# Patient Record
Sex: FEMALE | Race: WHITE | URBAN - METROPOLITAN AREA
[De-identification: names, ages, dates, MRNs, and addresses within clinical notes are randomized per-mention and may not be internally consistent; named-entity substitution may affect disease eponyms.]

---

## 2020-08-06 ENCOUNTER — OFFICE VISIT (OUTPATIENT)
Dept: FAMILY MEDICINE CLINIC | Age: 50
End: 2020-08-06
Payer: COMMERCIAL

## 2020-08-06 VITALS
HEART RATE: 81 BPM | WEIGHT: 263 LBS | HEIGHT: 68 IN | SYSTOLIC BLOOD PRESSURE: 100 MMHG | DIASTOLIC BLOOD PRESSURE: 62 MMHG | TEMPERATURE: 98 F | OXYGEN SATURATION: 98 % | BODY MASS INDEX: 39.86 KG/M2

## 2020-08-06 PROCEDURE — 99204 OFFICE O/P NEW MOD 45 MIN: CPT | Performed by: FAMILY MEDICINE

## 2020-08-06 RX ORDER — LANOLIN ALCOHOL/MO/W.PET/CERES
1000 CREAM (GRAM) TOPICAL DAILY
COMMUNITY

## 2020-08-06 ASSESSMENT — PATIENT HEALTH QUESTIONNAIRE - PHQ9
1. LITTLE INTEREST OR PLEASURE IN DOING THINGS: 0
SUM OF ALL RESPONSES TO PHQ9 QUESTIONS 1 & 2: 1
SUM OF ALL RESPONSES TO PHQ QUESTIONS 1-9: 1
2. FEELING DOWN, DEPRESSED OR HOPELESS: 1
SUM OF ALL RESPONSES TO PHQ QUESTIONS 1-9: 1

## 2020-08-06 NOTE — PROGRESS NOTES
2020    Rony White (:  1970) is a 44 y.o. female, here for evaluation of the following medical concerns:    Rony White is a 44 y.o. female. Patient presents with:  Establish Care      1. Radicular symptoms right arm  Prior disk surgery and nerve damage right arm. Pain fairly constant. Notes that it is worse with movement of neck and arm. 2.  Migraines: Not controlled by imitrex and topamax. Seeing neurology next week at [facility]. Has been having more frequency and imitrex is not fully stopping them. 3.  Insomnia: ambien xr not working well. Does not last long enough, and does not allow her to sleep well through the night. 4.  Patient has hx of blood clots, currently anticoagulated. Has had multiple previously. Doing well on current medications. The patients PMH, surgical history, family history, medications, allergies were all reviewed and updated as appropriate today. Review of Systems   Constitutional: Negative for fatigue and unexpected weight change. HENT: Negative for congestion, rhinorrhea and trouble swallowing. Eyes: Negative for pain and visual disturbance. Respiratory: Negative for chest tightness and shortness of breath. Cardiovascular: Negative for chest pain and palpitations. Gastrointestinal: Negative for constipation and diarrhea. Endocrine: Negative for cold intolerance and heat intolerance. Genitourinary: Negative for frequency and urgency. Musculoskeletal: Negative for arthralgias and back pain. Skin: Negative for color change and rash. Allergic/Immunologic: Negative for environmental allergies and food allergies. Neurological: Negative for dizziness and light-headedness. Hematological: Negative for adenopathy. Does not bruise/bleed easily. Psychiatric/Behavioral: Negative for dysphoric mood and sleep disturbance. Prior to Visit Medications    Medication Sig Taking?  Authorizing Provider   vitamin B-12 (CYANOCOBALAMIN) 1000 MCG tablet Take 1,000 mcg by mouth daily Yes Historical Provider, MD   Cholecalciferol (VITAMIN D3 PO) Take 1,000 Units by mouth 2 times daily Yes Historical Provider, MD   rivaroxaban (XARELTO) 20 MG TABS tablet Take 20 mg by mouth daily Yes Historical Provider, MD   zolpidem (AMBIEN CR) 12.5 MG extended release tablet Take 12.5 mg by mouth nightly as needed. Yes Historical Provider, MD   topiramate (TOPAMAX) 50 MG tablet Take 50 mg by mouth 2 times daily Yes Historical Provider, MD   SUMAtriptan (IMITREX) 50 MG tablet 100 mg 2 times daily Yes Historical Provider, MD   tamsulosin (FLOMAX) 0.4 MG capsule Take 0.4 mg by mouth nightly Yes Historical Provider, MD   oxyCODONE-acetaminophen (PERCOCET) 5-325 MG per tablet Take 1-2 tablets by mouth every 4 hours as needed. Yes Historical Provider, MD   albuterol sulfate  (90 Base) MCG/ACT inhaler Inhale 2 puffs into the lungs every 6 hours as needed for Wheezing Yes Historical Provider, MD   Ubrogepant (UBRELVY) 100 MG TABS 1 tab by mouth at onset of headache. May repeat after 2 hours if no relief. No more than 2 doses in 24 hours.   Historical Provider, MD        No Known Allergies    Past Medical History:   Diagnosis Date    Allergic rhinitis     Hx of blood clots     last 7/2020, hematology [facility]    Insomnia     Migraines     Nephrolithiasis        Past Surgical History:   Procedure Laterality Date    CARPAL TUNNEL RELEASE Left     CHOLECYSTECTOMY      KIDNEY STONE SURGERY      NECK SURGERY      fusion        Social History     Socioeconomic History    Marital status:      Spouse name: Not on file    Number of children: Not on file    Years of education: Not on file    Highest education level: Not on file   Occupational History    Not on file   Social Needs    Financial resource strain: Not on file    Food insecurity     Worry: Not on file     Inability: Not on file    Transportation needs     Medical: Not on file     Non-medical: Not on file   Tobacco Use    Smoking status: Never Smoker    Smokeless tobacco: Never Used   Substance and Sexual Activity    Alcohol use: Never     Frequency: Never    Drug use: Never    Sexual activity: Yes     Partners: Male     Comment: [address]    Physical activity     Days per week: Not on file     Minutes per session: Not on file    Stress: Not on file   Relationships    Social connections     Talks on phone: Not on file     Gets together: Not on file     Attends Taoist service: Not on file     Active member of club or organization: Not on file     Attends meetings of clubs or organizations: Not on file     Relationship status: Not on file    Intimate partner violence     Fear of current or ex partner: Not on file     Emotionally abused: Not on file     Physically abused: Not on file     Forced sexual activity: Not on file   Other Topics Concern    Not on file   Social History Narrative    Not on file        History reviewed. No pertinent family history. Vitals:    08/06/20 1007   BP: 100/62   Pulse: 81   Temp: 98 °F (36.7 °C)   TempSrc: Temporal   SpO2: 98%   Weight: 263 lb (119.3 kg)   Height: 5' 7.5\" (1.715 m)     Estimated body mass index is 40.58 kg/m² as calculated from the following:    Height as of this encounter: 5' 7.5\" (1.715 m). Weight as of this encounter: 263 lb (119.3 kg). Physical Exam  Vitals signs and nursing note reviewed. Constitutional:       Appearance: She is well-developed. HENT:      Head: Normocephalic and atraumatic. Right Ear: External ear normal.      Left Ear: External ear normal.      Nose: Nose normal.   Eyes:      Conjunctiva/sclera: Conjunctivae normal.      Pupils: Pupils are equal, round, and reactive to light. Neck:      Musculoskeletal: Normal range of motion and neck supple. Cardiovascular:      Rate and Rhythm: Normal rate and regular rhythm. Heart sounds: Normal heart sounds.    Pulmonary:      Effort: Pulmonary effort is normal.      Breath sounds: Normal breath sounds. Abdominal:      General: Bowel sounds are normal.      Palpations: Abdomen is soft. Musculoskeletal: Normal range of motion. Skin:     General: Skin is dry. Neurological:      Mental Status: She is alert and oriented to person, place, and time. Deep Tendon Reflexes: Reflexes are normal and symmetric. Psychiatric:         Behavior: Behavior normal.         Thought Content: Thought content normal.         Judgment: Judgment normal.         ASSESSMENT/PLAN:  1. Insomnia, unspecified type  Continue current medication. Trial of other therapies in future, including CBT    2. Intractable migraine without status migrainosus, unspecified migraine type  Will be meeting with neurology, will be trying Ubrelvy    3. Factor V Leiden mutation ([facility] [address]  Continue anticoagulation    4. Hx of blood clots  Continue anticoagulation    5. Cervical radiculopathy  Will look into pain management in future. No follow-ups on file. An  electronic signature was used to authenticate this note.     --Moreno Gomez MD on 8/6/2020 at 10:25 AM

## 2020-08-17 ASSESSMENT — ENCOUNTER SYMPTOMS
DIARRHEA: 0
BACK PAIN: 0
RHINORRHEA: 0
EYE PAIN: 0
COLOR CHANGE: 0
SHORTNESS OF BREATH: 0
CHEST TIGHTNESS: 0
TROUBLE SWALLOWING: 0
CONSTIPATION: 0